# Patient Record
Sex: MALE | Race: WHITE | NOT HISPANIC OR LATINO | Employment: UNEMPLOYED | URBAN - NONMETROPOLITAN AREA
[De-identification: names, ages, dates, MRNs, and addresses within clinical notes are randomized per-mention and may not be internally consistent; named-entity substitution may affect disease eponyms.]

---

## 2017-10-09 DIAGNOSIS — M25.572 BILATERAL ANKLE PAIN: Primary | ICD-10-CM

## 2017-10-09 DIAGNOSIS — M25.571 BILATERAL ANKLE PAIN: Primary | ICD-10-CM

## 2017-10-11 ENCOUNTER — RADIANT APPOINTMENT (OUTPATIENT)
Dept: GENERAL RADIOLOGY | Facility: OTHER | Age: 22
End: 2017-10-11
Attending: PHYSICIAN ASSISTANT
Payer: COMMERCIAL

## 2017-10-11 ENCOUNTER — OFFICE VISIT (OUTPATIENT)
Dept: ORTHOPEDICS | Facility: OTHER | Age: 22
End: 2017-10-11
Attending: PHYSICIAN ASSISTANT
Payer: COMMERCIAL

## 2017-10-11 VITALS
OXYGEN SATURATION: 96 % | HEART RATE: 60 BPM | WEIGHT: 169 LBS | DIASTOLIC BLOOD PRESSURE: 64 MMHG | TEMPERATURE: 97 F | SYSTOLIC BLOOD PRESSURE: 90 MMHG

## 2017-10-11 DIAGNOSIS — M25.571 BILATERAL ANKLE PAIN: ICD-10-CM

## 2017-10-11 DIAGNOSIS — M25.572 BILATERAL ANKLE PAIN: ICD-10-CM

## 2017-10-11 DIAGNOSIS — M25.572 PAIN OF JOINT OF LEFT ANKLE AND FOOT: ICD-10-CM

## 2017-10-11 DIAGNOSIS — M21.42 PES PLANUS OF BOTH FEET: ICD-10-CM

## 2017-10-11 DIAGNOSIS — M25.571 PAIN IN JOINT INVOLVING RIGHT ANKLE AND FOOT: Primary | ICD-10-CM

## 2017-10-11 DIAGNOSIS — M21.41 PES PLANUS OF BOTH FEET: ICD-10-CM

## 2017-10-11 PROCEDURE — 73610 X-RAY EXAM OF ANKLE: CPT | Mod: TC

## 2017-10-11 PROCEDURE — 99203 OFFICE O/P NEW LOW 30 MIN: CPT | Performed by: PHYSICIAN ASSISTANT

## 2017-10-11 ASSESSMENT — PAIN SCALES - GENERAL: PAINLEVEL: MILD PAIN (2)

## 2017-10-11 NOTE — NURSING NOTE
Chief Complaint   Patient presents with     Pain     bilateral ankle pain  right side 1 yr, left side 1 week       Initial BP 90/64 (BP Location: Left arm, Patient Position: Sitting, Cuff Size: Adult Regular)  Pulse 60  Temp 97  F (36.1  C) (Tympanic)  Wt 169 lb (76.7 kg)  SpO2 96% There is no height or weight on file to calculate BMI.  Medication Reconciliation: ashlyn Smith

## 2017-10-11 NOTE — PROGRESS NOTES
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  He indicates having a history of \"bad ankles\" from playing football and basketball in high school.  Most recently, he describes worsening of right ankle pain approximately 1 year back.  He describes slipping on ice one day and waking up the next morning with increased swelling and bruising about his right lateral ankle.  He sought treatment for this at [facility] and was diagnosed with an ankle sprain and placed into a boot.  He indicates no imaging was obtained but Care Everywhere demonstrates otherwise with negative x-rays of the right ankle and foot in January 2017.  He indicates no further follow-up after that time.  With regard to the left ankle, he indicates a recent injury approximately 1 week back when dancing.  He again he woke up with some increased swelling and pain involving the left lateral ankle.  This has since improved some.  He describes the right ankle as being worse than the left overall at this stage.  His pain in both ankles is worse at the end of the day following long periods on his feet such as when cooking.  He indicates being quite active otherwise, including working out consistently.  He requires use of ankle braces which do help.  He also notes purchasing new footwear in the recent past which seems to help.  He also describes a history of having flatfeet.  He has used over-the-counter inserts in the past when participating in sports in high school such as football and basketball.  He has not used any in the recent past.  He has never went to formal PT.  He is not using medications.  He denies associated numbness or tingling.    History reviewed. No pertinent past medical history.    History reviewed. No pertinent surgical history.    No current outpatient prescriptions on file. "       Allergies   Allergen Reactions     Amoxicillin Hives     Penicillins Hives       History reviewed. No pertinent family history.    Social History     Social History     Marital status: Single     Spouse name: N/A     Number of children: N/A     Years of education: N/A     Occupational History     Not on file.     Social History Main Topics     Smoking status: Never Smoker     Smokeless tobacco: Never Used     Alcohol use Not on file     Drug use: Not on file     Sexual activity: Not on file     Other Topics Concern     Not on file     Social History Narrative     No narrative on file       ROS:  See HPI      Physical exam:   BP 90/64 (BP Location: Left arm, Patient Position: Sitting, Cuff Size: Adult Regular)  Pulse 60  Temp 97  F (36.1  C) (Tympanic)  Wt 169 lb (76.7 kg)  SpO2 96%  Healthy-appearing 22-year-old male in no acute distress.  He is alert and oriented ×3.  His pleasant and cooperative.  He ambulates into the room today without difficulty.  He walks a normal gait.  On inspection, he has bilateral pes planus.  He otherwise has fairly neutral hindfoot bilaterally.  No gross deformities are appreciated about his ankles.  No appreciable edema or effusions today.  Skin is clean dry and intact throughout bilaterally.  Skin is normal to touch bilaterally.  On palpation, is minimally tender on palpation today but indicates what tenderness he has over the region of the ATFL and sinus Tarsi.  He is otherwise nontender about his bilateral ankles.  He has full and equal ROM bilaterally.  He demonstrates good strength of his TA, PT, GS, and peroneals equal bilaterally.  No gross instability noted with anterior drawer and varus stress tests bilaterally.  Negative squeeze test and ER stress tests bilaterally.  Compartments soft and non-tender to palpation bilaterally.  He has intact sensation to soft touch in all distal dermatomes including L4-S1 bilaterally.  He has 2+ dorsalis pedis and posterior tibialis  pulses bilaterally.  Distal motor fully intact bilaterally.    Imaging Studies:  X-rays of his bilateral ankles were obtained today.  No evidence of fracture or dislocation.  Mortise's congruent bilaterally.      Assessment:  Bilateral Ankle Pain.  History of prior ankle sprains.  Also has bilateral pes planus.  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    Plan:  I had a discussion with the patient in regards to his diagnosis as well as his prognosis.  We discussed treatment options as well.  I offered him a referral to PT and he declined indicating he does not have the time to go to PT.  We then discussed a home program which he will try.  If he changes his mind on formal PT he will let us know.  We also discussed a referral for orthotics versus trial of new OTC inserts.  He would like to try to get some new inserts and if they don't help he will let us know and we can place a referral for custom orthotics.  He will continue to wear the ankle braces as needed.  He may participate in activity as tolerated.  He will follow up in 4-6 weeks if not seeing any improvements.  He is in agreement with this plan.  All questions answered.    Prasad Christianson PA-C

## 2017-10-11 NOTE — MR AVS SNAPSHOT
"              After Visit Summary   10/11/2017    Brady Reynolds    MRN: [id number]           Patient Information     Date Of Birth          1995        Visit Information        Provider Department      10/11/2017 11:00 AM Prasad Christianson PA-C  [facility]        Today's Diagnoses     Pain in joint involving right ankle and foot    -  1    Pain of joint of left ankle and foot        Pes planus of both feet          Care Instructions    Begin home exercise program.  If you change your mind on formal PT, call and we will put a referral in.      Obtain and trial some new OTC inserts for your feet.  If you would like to get set up for custom orthotics, call and we can place referral.     Follow up in 4-6 weeks if not getting better.          Follow-ups after your visit        Who to contact     If you have questions or need follow up information about today's clinic visit or your schedule please contact  ORTHOPEDICS directly at [phone].  Normal or non-critical lab and imaging results will be communicated to you by Webcrunchhart, letter or phone within 4 business days after the clinic has received the results. If you do not hear from us within 7 days, please contact the clinic through Webcrunchhart or phone. If you have a critical or abnormal lab result, we will notify you by phone as soon as possible.  Submit refill requests through Mirubee or call your pharmacy and they will forward the refill request to us. Please allow 3 business days for your refill to be completed.          Additional Information About Your Visit        Webcrunchhart Information     Mirubee lets you send messages to your doctor, view your test results, renew your prescriptions, schedule appointments and more. To sign up, go to [email or web address] . Click on \"Log in\" on the left side of the screen, which will take you to the Welcome page. Then click on \"Sign up Now\" on the right side of the page.     You will be asked to enter the access " code listed below, as well as some personal information. Please follow the directions to create your username and password.     Your access code is: [email or web address]  Expires: 2018 11:26 AM     Your access code will  in 90 days. If you need help or a new code, please call your [facility] or [phone].        Care EveryWhere ID     This is your Care EveryWhere ID. This could be used by other organizations to access your [facility] medical records  [id number]        Your Vitals Were     Pulse Temperature Pulse Oximetry             60 97  F (36.1  C) (Tympanic) 96%          Blood Pressure from Last 3 Encounters:   10/11/17 90/64    Weight from Last 3 Encounters:   10/11/17 169 lb (76.7 kg)              Today, you had the following     No orders found for display       Primary Care Provider    None Specified       No primary provider on file.        Equal Access to Services     [facility]: Hadii lay Rloon, ricardo mcclendon, trinity kaalmaalexsander mora, inderjit adamson . So [facility] [phone].    ATENCIÓN: Si habla español, tiene a hayden disposición servicios gratuitos de asistencia lingüística. Llame al [phone].    We comply with applicable federal civil rights laws and [place] laws. We do not discriminate on the basis of race, color, national origin, age, disability, sex, sexual orientation, or gender identity.            Thank you!     Thank you for choosing  [facility]TH[facility]EDICS  for your care. Our goal is always to provide you with excellent care. Hearing back from our patients is one way we can continue to improve our services. Please take a few minutes to complete the written survey that you may receive in the mail after your visit with us. Thank you!             Your Updated Medication List - Protect others around you: Learn how to safely use, store and throw away your medicines at www.disposemymeds.org.      Notice  As of 10/11/2017 11:26 AM    You have not been  prescribed any medications.

## 2017-10-11 NOTE — PATIENT INSTRUCTIONS
Begin home exercise program.  If you change your mind on formal PT, call and we will put a referral in.      Obtain and trial some new OTC inserts for your feet.  If you would like to get set up for custom orthotics, call and we can place referral.     Follow up in 4-6 weeks if not getting better.

## 2022-02-02 ENCOUNTER — HOSPITAL ENCOUNTER (OUTPATIENT)
Dept: GENERAL RADIOLOGY | Facility: OTHER | Age: 27
End: 2022-02-02
Attending: FAMILY MEDICINE
Payer: COMMERCIAL

## 2022-02-02 ENCOUNTER — ALLIED HEALTH/NURSE VISIT (OUTPATIENT)
Dept: FAMILY MEDICINE | Facility: OTHER | Age: 27
End: 2022-02-02
Attending: FAMILY MEDICINE
Payer: COMMERCIAL

## 2022-02-02 VITALS
RESPIRATION RATE: 16 BRPM | OXYGEN SATURATION: 97 % | TEMPERATURE: 98.2 F | DIASTOLIC BLOOD PRESSURE: 70 MMHG | WEIGHT: 210.2 LBS | SYSTOLIC BLOOD PRESSURE: 128 MMHG | HEART RATE: 62 BPM

## 2022-02-02 DIAGNOSIS — Z20.822 COVID-19 RULED OUT: Primary | ICD-10-CM

## 2022-02-02 DIAGNOSIS — M77.8 TENDINITIS OF RIGHT TRICEPS: Primary | ICD-10-CM

## 2022-02-02 DIAGNOSIS — M25.529 ELBOW PAIN: ICD-10-CM

## 2022-02-02 PROCEDURE — 99204 OFFICE O/P NEW MOD 45 MIN: CPT | Performed by: FAMILY MEDICINE

## 2022-02-02 PROCEDURE — C9803 HOPD COVID-19 SPEC COLLECT: HCPCS

## 2022-02-02 PROCEDURE — U0003 INFECTIOUS AGENT DETECTION BY NUCLEIC ACID (DNA OR RNA); SEVERE ACUTE RESPIRATORY SYNDROME CORONAVIRUS 2 (SARS-COV-2) (CORONAVIRUS DISEASE [COVID-19]), AMPLIFIED PROBE TECHNIQUE, MAKING USE OF HIGH THROUGHPUT TECHNOLOGIES AS DESCRIBED BY CMS-2020-01-R: HCPCS | Mod: ZL

## 2022-02-02 PROCEDURE — 73080 X-RAY EXAM OF ELBOW: CPT | Mod: LT

## 2022-02-02 ASSESSMENT — PAIN SCALES - GENERAL: PAINLEVEL: NO PAIN (0)

## 2022-02-02 NOTE — PROGRESS NOTES
HPI:  26-year-old male coming in for evaluation of left elbow pain.  His pain started a couple months ago without inciting event or injury and seems to be getting worse.  He localizes the pain to the posterior elbow.  He has difficulty with lifting weights, particularly lifts involving resisted elbow extension (triceps extension with cables or tricep dips).  He has even started to notice difficulties pushing himself to get up off the floor or out of bed.  He rates his pain a 6/10.  He characterizes the pain as sharp and throbbing.  He has difficulty with sleeping as well.  He has tried icing.  He denies radicular symptoms.  He does have an associated popping that he feels when he is lifting heavier weights.  He feels like after this there is weakness and he is no longer able to lift as much weight for the remainder of his workout.  No symptoms on the left side.      EXAM:  /70 (BP Location: Right arm, Patient Position: Sitting, Cuff Size: Adult Large)   Pulse 62   Temp 98.2  F (36.8  C) (Tympanic)   Resp 16   Wt 95.3 kg (210 lb 3.2 oz)   SpO2 97%   MUSCULOSKELETAL EXAM:  RIGHT ELBOW  Inspection:  -No gross deformity  -No bruising or swelling  -Scars:  None    Tenderness to palpation of the:  -Shoulder:  Negative  -Biceps musculature:  Negative  -Triceps musculature:  Negative  -Antecubital fossa:  Negative  -Distal biceps tendon:  Negative  -Lateral epicondyle:  Negative  -Medial epicondyle:  Negative  -Radial head:  Negative  -Olecranon: Mild pain on the medial and lateral aspects near the ligamentous insertions  -Distal triceps tendon: Positive    Range of Motion:  -Passive flexion:  140  -Passive extension:  0  -Pronation:  90  -Supination:  90    Strength:  -Biceps:  5/5  -Triceps:  5/5 with mild pain    Sensation:  -Intact in the C5-T1 dermatomes    Motor:  -Intact AIN, PIN, and IO    Special Tests:  -Hook test:  Negative  -Valgus laxity:  Negative  -Milking maneuver:  Negative  -Push up from seated  position (PLRI test):  Negative    Other:  -No signs of cyanosis. Normal skin temperature of the upper extremity.  -Hand/wrist:  No gross deformity. Full range of motion.  -Left upper extremity:  No gross deformity. No palpable tenderness. Normal strength and ROM.      IMAGIN2022: 3 view right elbow x-ray  -No fracture, dislocation, or bony lesion      ASSESSMENT/PLAN:  Diagnoses and all orders for this visit:  Tendinitis of right triceps    26-year-old male with posterior right elbow pain most consistent with triceps tendinopathy.  He does have some pain on the medial and lateral aspect of the olecranon which may represent pathology of the ligamentous structures as they attach to the olecranon.  However, the majority of his pain seems to be coming with activation of the triceps musculature.  I feel that a treatment plan focusing on this pathology would be most beneficial at this time.  We did consider the possibility of PLRI but this is less likely as there was not one injury that led to the patient's symptoms.  Possible development of insidious onset over time.  We discussed treatment interventions to include activity modification,, therapy, OTC medications, and further work-up with MRI.  I feel like the patient would get most benefit from trying physical therapy initially.  -Referral placed to physical therapy  -Activities as tolerated, allow pain to guide  -Follow-up after 4-6 weeks of therapy  -If still having pain, consider MRI to evaluate JENA Armenta MD  2022  1:09 PM    Total time spent with this patient was 36 minutes which included chart review, visualization and interpretation of images, time spent with the patient, and documentation.

## 2022-02-02 NOTE — NURSING NOTE
Chief Complaint   Patient presents with     Consult     left elbow pain ongoing for 6 months     Patient presents to the clinic today for left elbow pain. Pain occurs with certain movements 8/10.    FOOD SECURITY SCREENING QUESTIONS  Hunger Vital Signs:  Within the past 12 months we worried whether our food would run out before we got money to buy more. Never  Within the past 12 months the food we bought just didn't last and we didn't have money to get more. Never  Jennie Jamison LPN 2/2/2022 1:08 PM      Initial /70 (BP Location: Right arm, Patient Position: Sitting, Cuff Size: Adult Large)   Pulse 62   Temp 98.2  F (36.8  C) (Tympanic)   Resp 16   Wt 95.3 kg (210 lb 3.2 oz)   SpO2 97%  There is no height or weight on file to calculate BMI.  Medication Reconciliation: complete    Jennie Jamison LPN

## 2022-02-04 LAB — SARS-COV-2 RNA RESP QL NAA+PROBE: NEGATIVE

## 2022-03-26 ENCOUNTER — HEALTH MAINTENANCE LETTER (OUTPATIENT)
Age: 27
End: 2022-03-26

## 2022-06-02 ENCOUNTER — ALLIED HEALTH/NURSE VISIT (OUTPATIENT)
Dept: FAMILY MEDICINE | Facility: OTHER | Age: 27
End: 2022-06-02
Attending: FAMILY MEDICINE
Payer: COMMERCIAL

## 2022-06-02 DIAGNOSIS — Z20.822 COVID-19 RULED OUT: Primary | ICD-10-CM

## 2022-06-02 PROCEDURE — U0003 INFECTIOUS AGENT DETECTION BY NUCLEIC ACID (DNA OR RNA); SEVERE ACUTE RESPIRATORY SYNDROME CORONAVIRUS 2 (SARS-COV-2) (CORONAVIRUS DISEASE [COVID-19]), AMPLIFIED PROBE TECHNIQUE, MAKING USE OF HIGH THROUGHPUT TECHNOLOGIES AS DESCRIBED BY CMS-2020-01-R: HCPCS | Mod: ZL

## 2022-06-02 PROCEDURE — C9803 HOPD COVID-19 SPEC COLLECT: HCPCS

## 2022-06-02 NOTE — PROGRESS NOTES
Patient here today for Covid test. Needed for concert.     Isabella Manriquez CNA .............................on 6/2/2022 at 8:08 AM

## 2022-06-03 LAB — SARS-COV-2 RNA RESP QL NAA+PROBE: NEGATIVE

## 2022-06-16 ENCOUNTER — OFFICE VISIT (OUTPATIENT)
Dept: FAMILY MEDICINE | Facility: OTHER | Age: 27
End: 2022-06-16
Attending: STUDENT IN AN ORGANIZED HEALTH CARE EDUCATION/TRAINING PROGRAM
Payer: COMMERCIAL

## 2022-06-16 VITALS
SYSTOLIC BLOOD PRESSURE: 132 MMHG | BODY MASS INDEX: 27.77 KG/M2 | OXYGEN SATURATION: 97 % | HEART RATE: 77 BPM | TEMPERATURE: 97.4 F | WEIGHT: 198.38 LBS | HEIGHT: 71 IN | RESPIRATION RATE: 18 BRPM | DIASTOLIC BLOOD PRESSURE: 68 MMHG

## 2022-06-16 DIAGNOSIS — R00.2 PALPITATIONS: ICD-10-CM

## 2022-06-16 DIAGNOSIS — Z00.00 ROUTINE GENERAL MEDICAL EXAMINATION AT A HEALTH CARE FACILITY: Primary | ICD-10-CM

## 2022-06-16 LAB
ALBUMIN SERPL-MCNC: 4.5 G/DL (ref 3.5–5.7)
ALP SERPL-CCNC: 85 U/L (ref 34–104)
ALT SERPL W P-5'-P-CCNC: 25 U/L (ref 7–52)
ANION GAP SERPL CALCULATED.3IONS-SCNC: 6 MMOL/L (ref 3–14)
AST SERPL W P-5'-P-CCNC: 46 U/L (ref 13–39)
BASOPHILS # BLD AUTO: 0 10E3/UL (ref 0–0.2)
BASOPHILS NFR BLD AUTO: 1 %
BILIRUB SERPL-MCNC: 0.5 MG/DL (ref 0.3–1)
BUN SERPL-MCNC: 13 MG/DL (ref 7–25)
CALCIUM SERPL-MCNC: 9.6 MG/DL (ref 8.6–10.3)
CHLORIDE BLD-SCNC: 103 MMOL/L (ref 98–107)
CO2 SERPL-SCNC: 31 MMOL/L (ref 21–31)
CREAT SERPL-MCNC: 0.99 MG/DL (ref 0.7–1.3)
EOSINOPHIL # BLD AUTO: 0.2 10E3/UL (ref 0–0.7)
EOSINOPHIL NFR BLD AUTO: 3 %
ERYTHROCYTE [DISTWIDTH] IN BLOOD BY AUTOMATED COUNT: 12.7 % (ref 10–15)
GFR SERPL CREATININE-BSD FRML MDRD: >90 ML/MIN/1.73M2
GLUCOSE BLD-MCNC: 70 MG/DL (ref 70–105)
HCT VFR BLD AUTO: 45.1 % (ref 40–53)
HGB BLD-MCNC: 16 G/DL (ref 13.3–17.7)
IMM GRANULOCYTES # BLD: 0 10E3/UL
IMM GRANULOCYTES NFR BLD: 1 %
LYMPHOCYTES # BLD AUTO: 1.8 10E3/UL (ref 0.8–5.3)
LYMPHOCYTES NFR BLD AUTO: 28 %
MAGNESIUM SERPL-MCNC: 2.1 MG/DL (ref 1.9–2.7)
MCH RBC QN AUTO: 31.7 PG (ref 26.5–33)
MCHC RBC AUTO-ENTMCNC: 35.5 G/DL (ref 31.5–36.5)
MCV RBC AUTO: 89 FL (ref 78–100)
MONOCYTES # BLD AUTO: 0.8 10E3/UL (ref 0–1.3)
MONOCYTES NFR BLD AUTO: 12 %
NEUTROPHILS # BLD AUTO: 3.6 10E3/UL (ref 1.6–8.3)
NEUTROPHILS NFR BLD AUTO: 55 %
NRBC # BLD AUTO: 0 10E3/UL
NRBC BLD AUTO-RTO: 0 /100
PLATELET # BLD AUTO: 209 10E3/UL (ref 150–450)
POTASSIUM BLD-SCNC: 4.1 MMOL/L (ref 3.5–5.1)
PROT SERPL-MCNC: 7.3 G/DL (ref 6.4–8.9)
RBC # BLD AUTO: 5.05 10E6/UL (ref 4.4–5.9)
SODIUM SERPL-SCNC: 140 MMOL/L (ref 134–144)
TSH SERPL DL<=0.005 MIU/L-ACNC: 0.49 MU/L (ref 0.4–4)
WBC # BLD AUTO: 6.5 10E3/UL (ref 4–11)

## 2022-06-16 PROCEDURE — 84443 ASSAY THYROID STIM HORMONE: CPT | Mod: ZL | Performed by: STUDENT IN AN ORGANIZED HEALTH CARE EDUCATION/TRAINING PROGRAM

## 2022-06-16 PROCEDURE — 99395 PREV VISIT EST AGE 18-39: CPT | Mod: 25 | Performed by: STUDENT IN AN ORGANIZED HEALTH CARE EDUCATION/TRAINING PROGRAM

## 2022-06-16 PROCEDURE — 90471 IMMUNIZATION ADMIN: CPT | Performed by: STUDENT IN AN ORGANIZED HEALTH CARE EDUCATION/TRAINING PROGRAM

## 2022-06-16 PROCEDURE — 85025 COMPLETE CBC W/AUTO DIFF WBC: CPT | Mod: ZL | Performed by: STUDENT IN AN ORGANIZED HEALTH CARE EDUCATION/TRAINING PROGRAM

## 2022-06-16 PROCEDURE — 99213 OFFICE O/P EST LOW 20 MIN: CPT | Mod: 25 | Performed by: STUDENT IN AN ORGANIZED HEALTH CARE EDUCATION/TRAINING PROGRAM

## 2022-06-16 PROCEDURE — 83735 ASSAY OF MAGNESIUM: CPT | Mod: ZL | Performed by: STUDENT IN AN ORGANIZED HEALTH CARE EDUCATION/TRAINING PROGRAM

## 2022-06-16 PROCEDURE — 82310 ASSAY OF CALCIUM: CPT | Mod: ZL | Performed by: STUDENT IN AN ORGANIZED HEALTH CARE EDUCATION/TRAINING PROGRAM

## 2022-06-16 PROCEDURE — 90715 TDAP VACCINE 7 YRS/> IM: CPT | Performed by: STUDENT IN AN ORGANIZED HEALTH CARE EDUCATION/TRAINING PROGRAM

## 2022-06-16 PROCEDURE — 36415 COLL VENOUS BLD VENIPUNCTURE: CPT | Mod: ZL | Performed by: STUDENT IN AN ORGANIZED HEALTH CARE EDUCATION/TRAINING PROGRAM

## 2022-06-16 PROCEDURE — 82374 ASSAY BLOOD CARBON DIOXIDE: CPT | Mod: ZL | Performed by: STUDENT IN AN ORGANIZED HEALTH CARE EDUCATION/TRAINING PROGRAM

## 2022-06-16 PROCEDURE — 93000 ELECTROCARDIOGRAM COMPLETE: CPT | Performed by: INTERNAL MEDICINE

## 2022-06-16 RX ORDER — SWAB
1 SWAB, NON-MEDICATED MISCELLANEOUS DAILY
COMMUNITY
Start: 2022-06-16

## 2022-06-16 ASSESSMENT — ENCOUNTER SYMPTOMS
DYSURIA: 0
SHORTNESS OF BREATH: 1
PARESTHESIAS: 0
FEVER: 0
CONSTIPATION: 0
HEMATOCHEZIA: 0
ARTHRALGIAS: 0
NAUSEA: 0
EYE PAIN: 0
JOINT SWELLING: 0
DIZZINESS: 0
MYALGIAS: 0
PALPITATIONS: 0
ABDOMINAL PAIN: 0
SORE THROAT: 0
NERVOUS/ANXIOUS: 0
DIARRHEA: 0
FREQUENCY: 0
COUGH: 0
HEMATURIA: 0
CHILLS: 0
HEADACHES: 0
HEARTBURN: 0
WEAKNESS: 0

## 2022-06-16 ASSESSMENT — PAIN SCALES - GENERAL: PAINLEVEL: NO PAIN (0)

## 2022-06-16 NOTE — NURSING NOTE
Patient presents to clinic for physical exam.  He has been having episodes of chest pain and a-fib.  He states this has been happening for the past month and while sleeping.    Medication Rec Complete  Alexandra Candelaria LPN............6/16/2022 1:04 PM
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
## 2022-06-17 LAB
ATRIAL RATE - MUSE: 58 BPM
DIASTOLIC BLOOD PRESSURE - MUSE: NORMAL MMHG
INTERPRETATION ECG - MUSE: NORMAL
P AXIS - MUSE: 41 DEGREES
PR INTERVAL - MUSE: 158 MS
QRS DURATION - MUSE: 98 MS
QT - MUSE: 390 MS
QTC - MUSE: 382 MS
R AXIS - MUSE: 58 DEGREES
SYSTOLIC BLOOD PRESSURE - MUSE: NORMAL MMHG
T AXIS - MUSE: 47 DEGREES
VENTRICULAR RATE- MUSE: 58 BPM

## 2022-07-14 ENCOUNTER — HOSPITAL ENCOUNTER (OUTPATIENT)
Dept: CARDIOLOGY | Facility: OTHER | Age: 27
Discharge: HOME | End: 2022-07-14
Attending: STUDENT IN AN ORGANIZED HEALTH CARE EDUCATION/TRAINING PROGRAM | Admitting: STUDENT IN AN ORGANIZED HEALTH CARE EDUCATION/TRAINING PROGRAM
Payer: COMMERCIAL

## 2022-07-14 DIAGNOSIS — R00.2 PALPITATIONS: ICD-10-CM

## 2022-07-14 PROCEDURE — 999N000157 HC STATISTIC RCP TIME EA 10 MIN

## 2022-07-14 PROCEDURE — 93242 EXT ECG>48HR<7D RECORDING: CPT

## 2022-07-14 PROCEDURE — 93244 EXT ECG>48HR<7D REV&INTERPJ: CPT | Performed by: INTERNAL MEDICINE

## 2022-07-14 NOTE — PATIENT INSTRUCTIONS
Date Placed on Pt:  7/14/2022    Patient instructed not to:   -take baths, swim, sauna   -remove device prior to 7 days   -use electric blankets   -shower or sweat excessively within first 24 hours of device application  Patient instructed to:   -shower as needed   -be carefull when toweling off and dressing   -press button when cardiac symptoms occur   -document symptoms in log book   -remove and return device (send via mail to [employer])   -Call [facility] with any questions

## 2022-09-17 ENCOUNTER — HEALTH MAINTENANCE LETTER (OUTPATIENT)
Age: 27
End: 2022-09-17

## 2022-10-30 ENCOUNTER — HOSPITAL ENCOUNTER (EMERGENCY)
Facility: OTHER | Age: 27
Discharge: HOME | End: 2022-10-30
Attending: STUDENT IN AN ORGANIZED HEALTH CARE EDUCATION/TRAINING PROGRAM | Admitting: STUDENT IN AN ORGANIZED HEALTH CARE EDUCATION/TRAINING PROGRAM

## 2022-10-30 ENCOUNTER — APPOINTMENT (OUTPATIENT)
Dept: CT IMAGING | Facility: OTHER | Age: 27
End: 2022-10-30
Attending: STUDENT IN AN ORGANIZED HEALTH CARE EDUCATION/TRAINING PROGRAM

## 2022-10-30 VITALS
WEIGHT: 200 LBS | OXYGEN SATURATION: 97 % | TEMPERATURE: 100 F | DIASTOLIC BLOOD PRESSURE: 87 MMHG | HEART RATE: 101 BPM | RESPIRATION RATE: 17 BRPM | HEIGHT: 72 IN | SYSTOLIC BLOOD PRESSURE: 138 MMHG | BODY MASS INDEX: 27.09 KG/M2

## 2022-10-30 DIAGNOSIS — R22.0 RIGHT FACIAL SWELLING: ICD-10-CM

## 2022-10-30 DIAGNOSIS — S09.90XA CLOSED HEAD INJURY, INITIAL ENCOUNTER: Primary | ICD-10-CM

## 2022-10-30 PROCEDURE — 99284 EMERGENCY DEPT VISIT MOD MDM: CPT | Performed by: STUDENT IN AN ORGANIZED HEALTH CARE EDUCATION/TRAINING PROGRAM

## 2022-10-30 PROCEDURE — 250N000013 HC RX MED GY IP 250 OP 250 PS 637: Performed by: STUDENT IN AN ORGANIZED HEALTH CARE EDUCATION/TRAINING PROGRAM

## 2022-10-30 PROCEDURE — 99284 EMERGENCY DEPT VISIT MOD MDM: CPT | Mod: 25

## 2022-10-30 PROCEDURE — 70450 CT HEAD/BRAIN W/O DYE: CPT | Mod: TC

## 2022-10-30 PROCEDURE — 70486 CT MAXILLOFACIAL W/O DYE: CPT | Mod: TC

## 2022-10-30 RX ORDER — ACETAMINOPHEN 500 MG
1000 TABLET ORAL ONCE
Status: COMPLETED | OUTPATIENT
Start: 2022-10-30 | End: 2022-10-30

## 2022-10-30 RX ORDER — OXYCODONE HYDROCHLORIDE 5 MG/1
5 TABLET ORAL ONCE
Status: COMPLETED | OUTPATIENT
Start: 2022-10-30 | End: 2022-10-30

## 2022-10-30 RX ADMIN — OXYCODONE HYDROCHLORIDE 5 MG: 5 TABLET ORAL at 06:20

## 2022-10-30 RX ADMIN — ACETAMINOPHEN 1000 MG: 500 TABLET ORAL at 06:20

## 2022-10-30 ASSESSMENT — ACTIVITIES OF DAILY LIVING (ADL): ADLS_ACUITY_SCORE: 35
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
## 2023-07-29 ENCOUNTER — HEALTH MAINTENANCE LETTER (OUTPATIENT)
Age: 28
End: 2023-07-29

## 2024-09-21 ENCOUNTER — HEALTH MAINTENANCE LETTER (OUTPATIENT)
Age: 29
End: 2024-09-21

## (undated) RX ORDER — OXYCODONE HYDROCHLORIDE 5 MG/1
TABLET ORAL
Status: DISPENSED
Start: 2022-10-30

## (undated) RX ORDER — ACETAMINOPHEN 500 MG
TABLET ORAL
Status: DISPENSED
Start: 2022-10-30